# Patient Record
Sex: MALE | Race: WHITE | Employment: OTHER | URBAN - METROPOLITAN AREA
[De-identification: names, ages, dates, MRNs, and addresses within clinical notes are randomized per-mention and may not be internally consistent; named-entity substitution may affect disease eponyms.]

---

## 2024-11-06 ENCOUNTER — OFFICE VISIT (OUTPATIENT)
Dept: PRIMARY CARE | Facility: CLINIC | Age: 39
End: 2024-11-06
Payer: MEDICARE

## 2024-11-06 ENCOUNTER — OFFICE VISIT (OUTPATIENT)
Dept: URGENT CARE | Age: 39
End: 2024-11-06
Payer: MEDICARE

## 2024-11-06 ENCOUNTER — HOSPITAL ENCOUNTER (EMERGENCY)
Facility: HOSPITAL | Age: 39
Discharge: HOME | End: 2024-11-06
Attending: EMERGENCY MEDICINE
Payer: MEDICARE

## 2024-11-06 ENCOUNTER — APPOINTMENT (OUTPATIENT)
Dept: RADIOLOGY | Facility: HOSPITAL | Age: 39
End: 2024-11-06
Payer: MEDICARE

## 2024-11-06 VITALS
BODY MASS INDEX: 25.71 KG/M2 | OXYGEN SATURATION: 100 % | RESPIRATION RATE: 18 BRPM | SYSTOLIC BLOOD PRESSURE: 129 MMHG | WEIGHT: 160 LBS | HEIGHT: 66 IN | TEMPERATURE: 97.9 F | DIASTOLIC BLOOD PRESSURE: 76 MMHG | HEART RATE: 62 BPM

## 2024-11-06 VITALS
OXYGEN SATURATION: 99 % | HEART RATE: 59 BPM | RESPIRATION RATE: 20 BRPM | DIASTOLIC BLOOD PRESSURE: 80 MMHG | SYSTOLIC BLOOD PRESSURE: 123 MMHG | BODY MASS INDEX: 25.71 KG/M2 | TEMPERATURE: 98.4 F | WEIGHT: 160 LBS | HEIGHT: 66 IN

## 2024-11-06 VITALS
BODY MASS INDEX: 25.36 KG/M2 | WEIGHT: 157.8 LBS | TEMPERATURE: 97.2 F | DIASTOLIC BLOOD PRESSURE: 70 MMHG | OXYGEN SATURATION: 100 % | HEIGHT: 66 IN | SYSTOLIC BLOOD PRESSURE: 118 MMHG | HEART RATE: 65 BPM

## 2024-11-06 DIAGNOSIS — N50.819 PAIN IN TESTICLE, UNSPECIFIED LATERALITY: Primary | ICD-10-CM

## 2024-11-06 DIAGNOSIS — N45.1 EPIDIDYMITIS: Primary | ICD-10-CM

## 2024-11-06 DIAGNOSIS — N50.812 PAIN IN LEFT TESTICLE: Primary | ICD-10-CM

## 2024-11-06 LAB
ALBUMIN SERPL BCP-MCNC: 4.7 G/DL (ref 3.4–5)
ALP SERPL-CCNC: 63 U/L (ref 33–120)
ALT SERPL W P-5'-P-CCNC: 26 U/L (ref 10–52)
ANION GAP SERPL CALC-SCNC: 10 MMOL/L (ref 10–20)
APPEARANCE UR: CLEAR
AST SERPL W P-5'-P-CCNC: 20 U/L (ref 9–39)
BASOPHILS # BLD AUTO: 0.03 X10*3/UL (ref 0–0.1)
BASOPHILS NFR BLD AUTO: 0.3 %
BILIRUB SERPL-MCNC: 0.4 MG/DL (ref 0–1.2)
BILIRUB UR STRIP.AUTO-MCNC: NEGATIVE MG/DL
BUN SERPL-MCNC: 19 MG/DL (ref 6–23)
CALCIUM SERPL-MCNC: 9.6 MG/DL (ref 8.6–10.3)
CHLORIDE SERPL-SCNC: 101 MMOL/L (ref 98–107)
CO2 SERPL-SCNC: 30 MMOL/L (ref 21–32)
COLOR UR: ABNORMAL
CREAT SERPL-MCNC: 1.2 MG/DL (ref 0.5–1.3)
EGFRCR SERPLBLD CKD-EPI 2021: 79 ML/MIN/1.73M*2
EOSINOPHIL # BLD AUTO: 0.2 X10*3/UL (ref 0–0.7)
EOSINOPHIL NFR BLD AUTO: 2.3 %
ERYTHROCYTE [DISTWIDTH] IN BLOOD BY AUTOMATED COUNT: 12.5 % (ref 11.5–14.5)
GLUCOSE SERPL-MCNC: 87 MG/DL (ref 74–99)
GLUCOSE UR STRIP.AUTO-MCNC: NORMAL MG/DL
HCT VFR BLD AUTO: 46.8 % (ref 41–52)
HGB BLD-MCNC: 15.9 G/DL (ref 13.5–17.5)
IMM GRANULOCYTES # BLD AUTO: 0.04 X10*3/UL (ref 0–0.7)
IMM GRANULOCYTES NFR BLD AUTO: 0.5 % (ref 0–0.9)
KETONES UR STRIP.AUTO-MCNC: NEGATIVE MG/DL
LEUKOCYTE ESTERASE UR QL STRIP.AUTO: NEGATIVE
LYMPHOCYTES # BLD AUTO: 2.39 X10*3/UL (ref 1.2–4.8)
LYMPHOCYTES NFR BLD AUTO: 27.7 %
MCH RBC QN AUTO: 29 PG (ref 26–34)
MCHC RBC AUTO-ENTMCNC: 34 G/DL (ref 32–36)
MCV RBC AUTO: 85 FL (ref 80–100)
MONOCYTES # BLD AUTO: 0.56 X10*3/UL (ref 0.1–1)
MONOCYTES NFR BLD AUTO: 6.5 %
MUCOUS THREADS #/AREA URNS AUTO: NORMAL /LPF
NEUTROPHILS # BLD AUTO: 5.41 X10*3/UL (ref 1.2–7.7)
NEUTROPHILS NFR BLD AUTO: 62.7 %
NITRITE UR QL STRIP.AUTO: NEGATIVE
NRBC BLD-RTO: 0 /100 WBCS (ref 0–0)
PH UR STRIP.AUTO: 6.5 [PH]
PLATELET # BLD AUTO: 280 X10*3/UL (ref 150–450)
POTASSIUM SERPL-SCNC: 3.7 MMOL/L (ref 3.5–5.3)
PROT SERPL-MCNC: 7.3 G/DL (ref 6.4–8.2)
PROT UR STRIP.AUTO-MCNC: NEGATIVE MG/DL
RBC # BLD AUTO: 5.48 X10*6/UL (ref 4.5–5.9)
RBC # UR STRIP.AUTO: ABNORMAL /UL
RBC #/AREA URNS AUTO: NORMAL /HPF
SODIUM SERPL-SCNC: 137 MMOL/L (ref 136–145)
SP GR UR STRIP.AUTO: 1.02
UROBILINOGEN UR STRIP.AUTO-MCNC: NORMAL MG/DL
WBC # BLD AUTO: 8.6 X10*3/UL (ref 4.4–11.3)
WBC #/AREA URNS AUTO: NORMAL /HPF

## 2024-11-06 PROCEDURE — 84075 ASSAY ALKALINE PHOSPHATASE: CPT | Performed by: NURSE PRACTITIONER

## 2024-11-06 PROCEDURE — 99202 OFFICE O/P NEW SF 15 MIN: CPT | Performed by: INTERNAL MEDICINE

## 2024-11-06 PROCEDURE — 76870 US EXAM SCROTUM: CPT | Performed by: RADIOLOGY

## 2024-11-06 PROCEDURE — 1036F TOBACCO NON-USER: CPT | Performed by: INTERNAL MEDICINE

## 2024-11-06 PROCEDURE — 3008F BODY MASS INDEX DOCD: CPT | Performed by: PHYSICIAN ASSISTANT

## 2024-11-06 PROCEDURE — 1036F TOBACCO NON-USER: CPT | Performed by: PHYSICIAN ASSISTANT

## 2024-11-06 PROCEDURE — 36415 COLL VENOUS BLD VENIPUNCTURE: CPT | Performed by: NURSE PRACTITIONER

## 2024-11-06 PROCEDURE — 2500000004 HC RX 250 GENERAL PHARMACY W/ HCPCS (ALT 636 FOR OP/ED)

## 2024-11-06 PROCEDURE — 93976 VASCULAR STUDY: CPT | Performed by: RADIOLOGY

## 2024-11-06 PROCEDURE — 81001 URINALYSIS AUTO W/SCOPE: CPT | Performed by: NURSE PRACTITIONER

## 2024-11-06 PROCEDURE — 2500000001 HC RX 250 WO HCPCS SELF ADMINISTERED DRUGS (ALT 637 FOR MEDICARE OP)

## 2024-11-06 PROCEDURE — 3008F BODY MASS INDEX DOCD: CPT | Performed by: INTERNAL MEDICINE

## 2024-11-06 PROCEDURE — 96365 THER/PROPH/DIAG IV INF INIT: CPT

## 2024-11-06 PROCEDURE — 85025 COMPLETE CBC W/AUTO DIFF WBC: CPT | Performed by: NURSE PRACTITIONER

## 2024-11-06 PROCEDURE — 99284 EMERGENCY DEPT VISIT MOD MDM: CPT | Mod: 25

## 2024-11-06 PROCEDURE — 99203 OFFICE O/P NEW LOW 30 MIN: CPT | Performed by: PHYSICIAN ASSISTANT

## 2024-11-06 PROCEDURE — 93975 VASCULAR STUDY: CPT

## 2024-11-06 RX ORDER — DOXYCYCLINE HYCLATE 50 MG/1
100 CAPSULE, GELATIN COATED ORAL ONCE
Status: COMPLETED | OUTPATIENT
Start: 2024-11-06 | End: 2024-11-06

## 2024-11-06 RX ORDER — CEFTRIAXONE 1 G/50ML
1 INJECTION, SOLUTION INTRAVENOUS ONCE
Status: COMPLETED | OUTPATIENT
Start: 2024-11-06 | End: 2024-11-06

## 2024-11-06 RX ORDER — IBUPROFEN 600 MG/1
600 TABLET ORAL EVERY 6 HOURS PRN
Qty: 28 TABLET | Refills: 0 | Status: SHIPPED | OUTPATIENT
Start: 2024-11-06 | End: 2024-11-13

## 2024-11-06 RX ORDER — ACETAMINOPHEN 500 MG
1000 TABLET ORAL EVERY 6 HOURS PRN
Qty: 30 TABLET | Refills: 0 | Status: SHIPPED | OUTPATIENT
Start: 2024-11-06 | End: 2024-11-16

## 2024-11-06 RX ORDER — DOXYCYCLINE 100 MG/1
100 CAPSULE ORAL 2 TIMES DAILY
Qty: 20 CAPSULE | Refills: 0 | Status: SHIPPED | OUTPATIENT
Start: 2024-11-07 | End: 2024-11-17

## 2024-11-06 ASSESSMENT — ENCOUNTER SYMPTOMS
SORE THROAT: 0
NUMBNESS: 0
BACK PAIN: 0
HEADACHES: 0
ALLERGIC/IMMUNOLOGIC NEGATIVE: 1
OCCASIONAL FEELINGS OF UNSTEADINESS: 0
DYSURIA: 0
VOMITING: 0
WEAKNESS: 0
HEMATOLOGIC/LYMPHATIC NEGATIVE: 1
EYE PAIN: 0
SHORTNESS OF BREATH: 0
RESPIRATORY NEGATIVE: 1
EYES NEGATIVE: 1
CONFUSION: 0
CARDIOVASCULAR NEGATIVE: 1
DEPRESSION: 0
TREMORS: 0
EYE DISCHARGE: 0
SEIZURES: 0
CONSTITUTIONAL NEGATIVE: 1
PSYCHIATRIC NEGATIVE: 1
FEVER: 0
LOSS OF SENSATION IN FEET: 0
WOUND: 0
FLANK PAIN: 0
BLOOD IN STOOL: 0
NEUROLOGICAL NEGATIVE: 1
CHILLS: 0
WHEEZING: 0
ABDOMINAL PAIN: 0
NAUSEA: 0
ENDOCRINE NEGATIVE: 1
NERVOUS/ANXIOUS: 0
FREQUENCY: 0
COUGH: 0
SLEEP DISTURBANCE: 0
CONSTIPATION: 0
APPETITE CHANGE: 0
TROUBLE SWALLOWING: 0
BACK PAIN: 0
UNEXPECTED WEIGHT CHANGE: 0
HEMATURIA: 0
DYSURIA: 0
JOINT SWELLING: 0
DIZZINESS: 0
ABDOMINAL PAIN: 0
DIARRHEA: 0
PALPITATIONS: 0

## 2024-11-06 ASSESSMENT — PAIN SCALES - GENERAL
PAINLEVEL_OUTOF10: 8
PAINLEVEL_OUTOF10: 5 - MODERATE PAIN
PAINLEVEL_OUTOF10: 0-NO PAIN

## 2024-11-06 ASSESSMENT — PAIN DESCRIPTION - ORIENTATION: ORIENTATION: LEFT

## 2024-11-06 ASSESSMENT — LIFESTYLE VARIABLES
HAVE YOU EVER FELT YOU SHOULD CUT DOWN ON YOUR DRINKING: NO
EVER FELT BAD OR GUILTY ABOUT YOUR DRINKING: NO
EVER HAD A DRINK FIRST THING IN THE MORNING TO STEADY YOUR NERVES TO GET RID OF A HANGOVER: NO
TOTAL SCORE: 0
HAVE PEOPLE ANNOYED YOU BY CRITICIZING YOUR DRINKING: NO

## 2024-11-06 ASSESSMENT — COLUMBIA-SUICIDE SEVERITY RATING SCALE - C-SSRS
6. HAVE YOU EVER DONE ANYTHING, STARTED TO DO ANYTHING, OR PREPARED TO DO ANYTHING TO END YOUR LIFE?: NO
1. IN THE PAST MONTH, HAVE YOU WISHED YOU WERE DEAD OR WISHED YOU COULD GO TO SLEEP AND NOT WAKE UP?: NO
2. HAVE YOU ACTUALLY HAD ANY THOUGHTS OF KILLING YOURSELF?: NO

## 2024-11-06 ASSESSMENT — PAIN DESCRIPTION - LOCATION: LOCATION: SCROTUM

## 2024-11-06 ASSESSMENT — PATIENT HEALTH QUESTIONNAIRE - PHQ9
2. FEELING DOWN, DEPRESSED OR HOPELESS: NOT AT ALL
1. LITTLE INTEREST OR PLEASURE IN DOING THINGS: NOT AT ALL
SUM OF ALL RESPONSES TO PHQ9 QUESTIONS 1 AND 2: 0

## 2024-11-06 ASSESSMENT — PAIN DESCRIPTION - PAIN TYPE: TYPE: ACUTE PAIN

## 2024-11-06 ASSESSMENT — PAIN - FUNCTIONAL ASSESSMENT: PAIN_FUNCTIONAL_ASSESSMENT: 0-10

## 2024-11-06 NOTE — Clinical Note
Stoney Clemente was seen and treated in our emergency department on 11/6/2024.  He may return to work on 11/07/2024.       If you have any questions or concerns, please don't hesitate to call.      Gael Berg MD

## 2024-11-06 NOTE — ED TRIAGE NOTES
TRIAGE NOTE   I saw the patient as the Clinician in Triage and performed a brief history and physical exam, established acuity, and ordered appropriate tests to develop basic plan of care. Patient will be seen by an CLYDE, resident and/or physician who will independently evaluate the patient. Please see subsequent provider notes for further details and disposition.     Brief HPI: In brief, Stoney Clemente is a 39 y.o. male who is otherwise healthy with no previous abdominal surgeries presenting to ED today from home by himself for evaluation of left testicle pain.  Yesterday while at work, the pain is noticed a discomfort in his left testicle, no swelling.  No trauma.  Pain is gotten progressively worse, currently rated 5/10.  Exacerbated with movement.  Tylenol taken yesterday provided mild relief of symptoms.  Sexually active with 1 female partner, no suspicion for STD.  Denies fever/chills, cough/cold symptoms, chest pain, shortness of breath, nausea/vomiting, abdominal pain, urinary symptoms, change in bowel habits or any other complaints.  Occasional EtOH, no smoking or drug use.  No current PCP.    Focused Physical exam:   General: 39-year-old  male, awake and alert, oriented x 3.  Well-nourished and hydrated.  Nontoxic looking.  Skin: Pink, warm and dry.  Cardiac: Regular rate and rhythm.  Pulmonary: Clear bilaterally.  Abdomen: Flat and soft with bowel sounds, nontender.  : Deferred as evaluated in triage    Plan/MDM:   Healthy 39-year-old male's evaluated the bedside for 24 hours of left testicle pain that began midday yesterday and has gotten progressively worse.  On arrival to the ED, awake and alert, vital signs within normal limits.  Afebrile.  IV established, will check basic labs, UA/urine culture and perform scrotal ultrasound.  Patient is agreeable to this plan.    Please see subsequent provider note for further details and disposition

## 2024-11-06 NOTE — PROGRESS NOTES
"Subjective   Patient ID: Stoney Kothari is a 39 y.o. male. They present today with a chief complaint of Testicle Pain (Patient presents today for L testicle pain that began yesterday. He had a Vasectomy almost 2 years ago. He states his pain level is an 8./).    History of Present Illness    History provided by:  Patient   used: No    Testicle Pain  Associated symptoms: no abdominal pain    This is a 39 yr old male here for left testicle pain x 1 day. No testicle swelling, dysuria, abdominal pain, back pain, or uretheral discharge.     Past Medical History  Allergies as of 11/06/2024    (No Known Allergies)       (Not in a hospital admission)       No past medical history on file.    No past surgical history on file.     reports that he has never smoked. He has never used smokeless tobacco. Alcohol use questions deferred to the physician. Drug use questions deferred to the physician.    Review of Systems  Review of Systems   Constitutional: Negative.    HENT: Negative.     Eyes: Negative.    Respiratory: Negative.     Cardiovascular: Negative.    Gastrointestinal:  Negative for abdominal pain.   Endocrine: Negative.    Genitourinary:  Positive for testicular pain. Negative for dysuria, penile discharge and scrotal swelling.   Musculoskeletal:  Negative for back pain.   Skin: Negative.    Allergic/Immunologic: Negative.    Neurological: Negative.    Hematological: Negative.    Psychiatric/Behavioral: Negative.     All other systems reviewed and are negative.      Objective    Vitals:    11/06/24 0910   BP: 123/80   BP Location: Left arm   Patient Position: Sitting   BP Cuff Size: Adult   Pulse: 59   Resp: 20   Temp: 36.9 °C (98.4 °F)   TempSrc: Oral   SpO2: 99%   Weight: 72.6 kg (160 lb)   Height: 1.676 m (5' 6\")     No LMP for male patient.    Physical Exam  Vitals and nursing note reviewed.   Constitutional:       Appearance: Normal appearance.   HENT:      Head: Normocephalic and atraumatic. "   Cardiovascular:      Rate and Rhythm: Normal rate and regular rhythm.   Pulmonary:      Effort: Pulmonary effort is normal.      Breath sounds: Normal breath sounds.   Abdominal:      Palpations: Abdomen is soft.      Tenderness: There is no abdominal tenderness.   Genitourinary:     Comments: Left testicle pain with palpation, no edema or overlying skin erythema  Skin:     General: Skin is warm and dry.   Neurological:      General: No focal deficit present.      Mental Status: He is alert and oriented to person, place, and time.   Psychiatric:         Mood and Affect: Mood normal.         Behavior: Behavior normal.       Procedures    Point of Care Test & Imaging Results from this visit  No results found for this visit on 11/06/24.   No results found.    Diagnostic study results (if any) were reviewed by Mariann Patricio PA-C.    Assessment/Plan   Allergies, medications, history, and pertinent labs/EKGs/Imaging reviewed by Mariann Patricio PA-C.   Orders and Diagnoses  Diagnoses and all orders for this visit:  Pain in left testicle    Plan:  Pt needs a scrotal ultrasound not available in UC setting  No pcp  Advised to go to the ER today    Patient disposition: ED    Electronically signed by Mariann Patricio PA-C  9:16 AM

## 2024-11-06 NOTE — ED PROVIDER NOTES
Emergency Department Provider Note        History of Present Illness     History provided by: Patient  Limitations to History: None  External Records Reviewed with Brief Summary: None    HPI:  Stoney Clemente is a 39 y.o. male, otherwise healthy, presenting to the ED with a complaint of left testicular pain.  Patient reports having gradually worsening pain of his left testicle starting yesterday and overnight it was severe up to an 8/10 making it difficult to sleep.  Reports pain is currently 6/10 and described as somewhat achy.  He has never had this pain before.  No trauma.  No swelling, fevers, penile discharge, rash, or new sexual partners.  Patient is  with 1 wife.  No prior history of symptomatic STD/STI.  Denies dysuria, URI symptoms, vomiting, diarrhea, or redness in urine or stool.    Physical Exam   Triage vitals:  T 36.6 °C (97.9 °F)  HR 62  /76  RR 18  O2 100 % None (Room air)    General: Awake, alert, in no acute distress  Eyes: Gaze conjugate.  No scleral icterus or injection  HENT: Normo-cephalic, atraumatic. No stridor  CV: Regular rate, regular rhythm. Radial pulses 2+ bilaterally  Resp: Breathing non-labored, speaking in full sentences.  Clear to auscultation bilaterally  GI: Soft, non-distended, non-tender. No rebound or guarding.  : Chaperone, Dr. Klerman, at bedside. Tenderness to palpation of left testicle.  No varicocele, swelling, redness, penile discharge, or rash.  Cremasteric reflex present.  MSK/Extremities: No gross bony deformities. Moving all extremities  Skin: Warm. Appropriate color  Neuro: Alert. Oriented. Face symmetric. Speech is fluent.  Gross strength and sensation intact in b/l UE and LEs  Psych: Appropriate mood and affect    Medical Decision Making & ED Course   Medical Decision Makin y.o. male, healthy, , evaluated in the ED for gradually worsening left testicular pain over the past day without trauma, swelling, fevers, dysuria, or new sexual  partners.  Patient is afebrile, sinus, hypertensive, saturating well on room air, and in no acute distress.  On exam there is tenderness palpation of his left testicle but no discharge, erythema, or swelling.      Patient's history and presentation is concerning for epididymitis or orchitis but cannot rule out testicular torsion, malignancy, or emboli.    Labs and imaging obtained from triage unimpressive for UTI or systemic inflammation. Scrotal ultrasound significant for epididymitis.  No torsion or lack of blood flow.    Patient updated on findings and management for this including Tylenol and ibuprofen.  Due to patient's age less than 40 he is at risk for STD/STI which he may have had as a child or recently contracted.  Patient informed of this and recommendation for STD/STI testing in which he was agreeable with.  Patient covered with Rocephin and doxycycline.  Patient discharged to follow-up on gonorrhea chlamydia testing results.  ----  Differential diagnoses considered include but are not limited to: Testicular torsion, epididymitis, orchitis, embolus, trauma, malignancy     Social Determinants of Health which Significantly Impact Care: None identified     EKG Independent Interpretation: EKG not obtained    Independent Result Review and Interpretation: Relevant laboratory and radiographic results were reviewed and independently interpreted by myself.  As necessary, they are commented on in the ED Course.    Chronic conditions affecting the patient's care: As documented above in [facility]    The patient was discussed with the following consultants/services: None    Care Considerations: As documented above in [facility]    ED Course:  ED Course as of 11/06/24 1712   Wed Nov 06, 2024   1656 On my interpretation of labs, results are unimpressive for systemic inflammation or infection, acute anemia or blood loss, KETTY, hepatitis, UTI, or electrolyte abnormalities. [ES]   1709 Pt seen with resident - this is a 39yM sexually  active with 1 partner who presents with L testicular pain since last night.  No fever, n/v/d or abd pain.  Labs reassuring.  UA without UTI.  US interpreted by me with normal flow to b/l testicles, read by radiology as epididymitis.  Discussed with patient - will treat empirically with antibiotics and refer to urology.  Ok to dc with return precautions. [EK]      ED Course User Index  [EK] Elyse H Klerman, MD  [ES] Gael Berg MD         Diagnoses as of 11/06/24 1712   Epididymitis     Disposition   As a result of the work-up, the patient was discharged home.  he was informed of his diagnosis and instructed to come back with any concerns or worsening of condition.  he and was agreeable to the plan as discussed above.  he was given the opportunity to ask questions.  All of the patient's questions were answered.    Procedures   Procedures    This was a shared visit with an ED attending.  The patient was seen and discussed with the ED attending    Gael Berg MD  Emergency Medicine, PGY3     Gael Berg MD  Resident  11/07/24 8642

## 2024-11-06 NOTE — DISCHARGE INSTRUCTIONS
You are seen in the emergency department for left testicular pain.  Your workup today was unremarkable for life-threatening or emergent causes.  The gland around your testicle was found to be inflamed.    Please utilize ibuprofen and Tylenol as needed for this inflammation.    Seek immediate medical attention should you have:  fever of 38 C (100.4 F), shortness of breath, discharge, weakness, swelling, redness, abdominal pain, confusion, vomiting, or any worsening or concerning symptoms.

## 2024-11-06 NOTE — PROGRESS NOTES
"Subjective   Patient ID: Stoney Kothari is a 39 y.o. male who presents for New Patient Visit and Testicle Pain (Started 11/5/24, vasectomy last year ).    HPI:  Per chart review :   This patient was seen this morning at  urgent care for complaint of testicular pain and he was referred to emergency room for scrotal ultrasound, and he did not go.    Instead he scheduled same-day appointment with me for new patient visit and above complaint.     Pt stated he called central scheduling and requesting an immediate  US and they put him on my schedule- ASAP/first available.    Per central scheduling reason for this visit is \" NPV, establish care\"- there is no mention of testicular pain or pt. requesting an US.    Testicle Pain  The patient's primary symptoms include testicular pain. Pertinent negatives include no abdominal pain, chest pain, chills, constipation, coughing, diarrhea, dysuria, fever, flank pain, frequency, headaches, nausea, rash, shortness of breath, sore throat, urgency or vomiting.        Review of Systems   Constitutional:  Negative for appetite change, chills, fever and unexpected weight change.   HENT:  Negative for congestion, ear pain, sneezing, sore throat and trouble swallowing.    Eyes:  Negative for pain, discharge and visual disturbance.   Respiratory:  Negative for cough, shortness of breath and wheezing.    Cardiovascular:  Negative for chest pain, palpitations and leg swelling.   Gastrointestinal:  Negative for abdominal pain, blood in stool, constipation, diarrhea, nausea and vomiting.   Genitourinary:  Positive for testicular pain. Negative for dysuria, flank pain, frequency, hematuria and urgency.   Musculoskeletal:  Negative for back pain, gait problem and joint swelling.   Skin:  Negative for rash and wound.   Neurological:  Negative for dizziness, tremors, seizures, syncope, weakness, numbness and headaches.   Psychiatric/Behavioral:  Negative for confusion, sleep disturbance and suicidal " "ideas. The patient is not nervous/anxious.        Objective   /70 (BP Location: Right arm, Patient Position: Sitting)   Pulse 65   Temp 36.2 °C (97.2 °F)   Ht 1.676 m (5' 6\")   Wt 71.6 kg (157 lb 12.8 oz)   SpO2 100%   BMI 25.47 kg/m²   Patient Health Questionnaire-2 Score: 0      Physical Exam  Vitals and nursing note reviewed.   Constitutional:       Appearance: Normal appearance.   Neurological:      General: No focal deficit present.      Mental Status: He is alert.   Psychiatric:         Mood and Affect: Mood normal.      PT REFUSED TO BE EXAMINED BY ME STATING\" WHAT\"S the point?\"  \" I am here for an ultrasound and you cannot give me that !\"  I tried to explain that I can order an US ( non-emergent) based on my P.E , but pt refused.  Per central scheduling reason for this visit is \" NPV, establish care\"- there is no mention of testicular pain or pt requesting an US.  I informed PM Maeve about this unfortunate situation.  Pt LEFT W/O being examined and aware needs to go ER.      Assessment/Plan   Assessment & Plan  Pain in testicle, unspecified laterality     PT REFUSED TO BE EXAMINED BY ME STATING\" WHAT\"S the point?\"  \" I am here for an ultrasound\"  I tried to explain that I can order an US ( non-emergent) based on my P.E , but pt refused ANY examination.  Per central scheduling reason for this visit is \" NPV, establish care\"- there is no mention of testicular pain or pt requesting an US.  I informed PM Maeve about this unfortunate situation.  Pt LEFT W/O being examined and aware needs to go ER.              "

## 2024-11-07 LAB — HOLD SPECIMEN: NORMAL
